# Patient Record
Sex: MALE | Race: WHITE | Employment: FULL TIME | URBAN - METROPOLITAN AREA
[De-identification: names, ages, dates, MRNs, and addresses within clinical notes are randomized per-mention and may not be internally consistent; named-entity substitution may affect disease eponyms.]

---

## 2021-03-04 ENCOUNTER — APPOINTMENT (OUTPATIENT)
Dept: ULTRASOUND IMAGING | Age: 46
End: 2021-03-04
Attending: EMERGENCY MEDICINE
Payer: MEDICARE

## 2021-03-04 ENCOUNTER — HOSPITAL ENCOUNTER (EMERGENCY)
Age: 46
Discharge: HOME | End: 2021-03-04
Attending: EMERGENCY MEDICINE
Payer: MEDICARE

## 2021-03-04 VITALS
RESPIRATION RATE: 17 BRPM | BODY MASS INDEX: 29.62 KG/M2 | OXYGEN SATURATION: 97 % | WEIGHT: 200 LBS | TEMPERATURE: 98.5 F | HEIGHT: 69 IN | HEART RATE: 100 BPM | DIASTOLIC BLOOD PRESSURE: 96 MMHG | SYSTOLIC BLOOD PRESSURE: 139 MMHG

## 2021-03-04 DIAGNOSIS — M79.674 PAIN OF TOE OF RIGHT FOOT: Primary | ICD-10-CM

## 2021-03-04 LAB
BASOPHILS # BLD: 0.1 K/UL (ref 0–0.2)
BASOPHILS NFR BLD: 1 % (ref 0–2)
DIFFERENTIAL METHOD BLD: ABNORMAL
EOSINOPHIL # BLD: 0.2 K/UL (ref 0–0.8)
EOSINOPHIL NFR BLD: 2 % (ref 0.5–7.8)
ERYTHROCYTE [DISTWIDTH] IN BLOOD BY AUTOMATED COUNT: 14.3 % (ref 11.9–14.6)
HCT VFR BLD AUTO: 49.6 % (ref 41.1–50.3)
HGB BLD-MCNC: 17.1 G/DL (ref 13.6–17.2)
IMM GRANULOCYTES # BLD AUTO: 0.1 K/UL (ref 0–0.5)
IMM GRANULOCYTES NFR BLD AUTO: 0 % (ref 0–5)
INR PPP: 0.9
LYMPHOCYTES # BLD: 1.9 K/UL (ref 0.5–4.6)
LYMPHOCYTES NFR BLD: 15 % (ref 13–44)
MCH RBC QN AUTO: 27 PG (ref 26.1–32.9)
MCHC RBC AUTO-ENTMCNC: 34.5 G/DL (ref 31.4–35)
MCV RBC AUTO: 78.4 FL (ref 79.6–97.8)
MONOCYTES # BLD: 0.8 K/UL (ref 0.1–1.3)
MONOCYTES NFR BLD: 6 % (ref 4–12)
NEUTS SEG # BLD: 10 K/UL (ref 1.7–8.2)
NEUTS SEG NFR BLD: 76 % (ref 43–78)
NRBC # BLD: 0 K/UL (ref 0–0.2)
PLATELET # BLD AUTO: 323 K/UL (ref 150–450)
PMV BLD AUTO: 9.5 FL (ref 9.4–12.3)
PROTHROMBIN TIME: 13 SEC (ref 12.5–14.7)
RBC # BLD AUTO: 6.33 M/UL (ref 4.23–5.6)
WBC # BLD AUTO: 13 K/UL (ref 4.3–11.1)

## 2021-03-04 PROCEDURE — 85025 COMPLETE CBC W/AUTO DIFF WBC: CPT

## 2021-03-04 PROCEDURE — 99283 EMERGENCY DEPT VISIT LOW MDM: CPT

## 2021-03-04 PROCEDURE — 85610 PROTHROMBIN TIME: CPT

## 2021-03-04 PROCEDURE — 93926 LOWER EXTREMITY STUDY: CPT

## 2021-03-04 RX ORDER — SULFAMETHOXAZOLE AND TRIMETHOPRIM 800; 160 MG/1; MG/1
1 TABLET ORAL DAILY
COMMUNITY

## 2021-03-04 RX ORDER — CLOPIDOGREL BISULFATE 75 MG/1
75 TABLET ORAL DAILY
COMMUNITY

## 2021-03-04 NOTE — ED TRIAGE NOTES
Pt c/o R foot 4th toe loss of sensation and purple discoloration for the past few days. States he has a hx of DVT that has moved down his leg. He is currently on Plavix and bactrim. States his medical charts are all in [place].

## 2021-03-04 NOTE — ED PROVIDER NOTES
54year old male who presents today with complaint of right 4th toe becoming purple for the past 2 days. He noticed this again around 1400 today and called his doctors in [place] who told him to come to the ER today. He states that the discoloration has improved since he first noticed it. He reports hx of a blood clots and states he is on Plavix. He states that about 3 months ago he had been diagnosed with a clot in his left lower abdomen that traveled into his right leg. This caused some necrosis to the 4th digit in his right foot, which left him with some loss of sensation in that toe. He states \"I was going to lose that toe but they started a bunch of blood thinners. 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 The history is provided by the patient. Past Medical History:   Diagnosis Date    Thrombosis of abdominal aorta (HCC)     Whipple's disease        History reviewed. No pertinent surgical history. History reviewed. No pertinent family history.     Social History     Socioeconomic History    Marital status: SINGLE     Spouse name: Not on file    Number of children: Not on file    Years of education: Not on file    Highest education level: Not on file   Occupational History    Not on file   Social Needs    Financial resource strain: Not on file    Food insecurity     Worry: Not on file     Inability: Not on file    Transportation needs     Medical: Not on file     Non-medical: Not on file   Tobacco Use    Smoking status: Never Smoker    Smokeless tobacco: Current User   Substance and Sexual Activity    Alcohol use: Yes     Comment: occasional    Drug use: Never    Sexual activity: Not Currently   Lifestyle    Physical activity     Days per week: Not on file     Minutes per session: Not on file    Stress: Not on file   Relationships    Social connections     Talks on phone: Not on file     Gets together: Not on file     Attends [employer] service: Not on file     Active member of club or organization: Not on file     Attends meetings of clubs or organizations: Not on file     Relationship status: Not on file    Intimate partner violence     Fear of current or ex partner: Not on file     Emotionally abused: Not on file     Physically abused: Not on file     Forced sexual activity: Not on file   Other Topics Concern    Not on file   Social History Narrative    Not on file         ALLERGIES: Penicillins    Review of Systems   Constitutional: Negative for fatigue and fever. Respiratory: Negative for shortness of breath. Cardiovascular: Negative for chest pain and leg swelling. Gastrointestinal: Negative for diarrhea, nausea and vomiting. Skin: Positive for color change. 4th toe on right foot   Neurological: Negative for dizziness, syncope, light-headedness and headaches. Vitals:    03/04/21 1634   BP: (!) 139/96   Pulse: (!) 105   Resp: 18   Temp: 98.5 °F (36.9 °C)   SpO2: 96%   Weight: 90.7 kg (200 lb)   Height: 5' 9\" (1.753 m)            Physical Exam  Vitals signs and nursing note reviewed. Constitutional:       Appearance: Normal appearance. HENT:      Head: Normocephalic and atraumatic. Right Ear: External ear normal.      Left Ear: External ear normal.      Mouth/Throat:      Mouth: Mucous membranes are moist.      Pharynx: Oropharynx is clear. Eyes:      General: No scleral icterus. Extraocular Movements: Extraocular movements intact. Neck:      Musculoskeletal: Normal range of motion and neck supple. Cardiovascular:      Rate and Rhythm: Normal rate. Pulses: Normal pulses. Dorsalis pedis pulses are 2+ on the right side and 2+ on the left side. Posterior tibial pulses are 2+ on the right side and 2+ on the left side. Heart sounds: Normal heart sounds.    Pulmonary:      Effort: Pulmonary effort is normal. No respiratory distress. Breath sounds: Normal breath sounds. Abdominal:      General: Abdomen is flat. Bowel sounds are normal.      Palpations: Abdomen is soft. Musculoskeletal: Normal range of motion. Right lower leg: No edema. Left lower leg: No edema. Feet:      Right foot:      Skin integrity: Skin integrity normal.      Left foot:      Skin integrity: Skin integrity normal.      Comments: Slight cyanosis discoloration to 4th and 5th digit of right foot, easily blanchable. No edema. He has strong, equal pulses bilaterally. He reports lack of sensation in 4th digit of right foot upon exam, but normal sensation in other digits. He has good capillary refill <3 seconds. Skin:     General: Skin is warm and dry. Neurological:      General: No focal deficit present. Mental Status: He is alert and oriented to person, place, and time. Psychiatric:         Mood and Affect: Mood normal.         Behavior: Behavior normal.          MDM  Number of Diagnoses or Management Options  Pain of toe of right foot  Diagnosis management comments: Ultrasound ordered to evaluate for arterial clot. 9:41 PM  Preliminary report from Dr. Jose De Jesus Grajeda reported as normal flow through popliteal. I have discussed results with patient. His toes at this time appear pink and warm, he has had his feet elevated on stretcher. I discussed with him the importance of continuing his medications and follow-up with his physician in [address] [place] [address]. I have provided him with information for Dr. Samina Malin with [facility] here in the case that he is in town for an extended period. He verbalizes understanding of information provided and to return to the ER for worsening of symptoms. He agrees with plan to discharge home at this time.         Amount and/or Complexity of Data Reviewed  Clinical lab tests: ordered and reviewed  Tests in the radiology section of CPT®: ordered and reviewed    Risk of Complications, Morbidity, and/or Mortality  Presenting problems: high  Diagnostic procedures: moderate  Management options: moderate    Patient Progress  Patient progress: improved    ED Course as of Mar 04 2141   Thu Mar 04, 2021   1735 I have requested records from [facility] in [place], [place]. Patient is unsure if reported clot he had 3 months ago was arterial or venous. [BA]   1831 Records received from [facility]. Note states patient was supposed to be on Eliquis. Patient states he is on Eliquis, but he could not remember the name. He is also on Plavix and Bactrim daily. Denies other medications. Note states he had a thrombus of the abdominal aorta.      [BA]   2025 Prothrombin time: 13.0 [BA]   2025 INR: 0.9 [BA]   2025 PLATELET: 603 [BA]   2140 Preliminary report by Dr. Michel Marcelo normal flow through right popliteal.     [BA]      ED Course User Index  [BA] Shanthi Escamilla NP       Procedures

## 2021-03-04 NOTE — ED NOTES
Upon examination, pt's toe is pink and warm. He states it was purple and cool to touch earlier today.

## 2021-03-05 NOTE — DISCHARGE INSTRUCTIONS
As we discussed, the ultrasound did not show any clot extending to your popliteal (knee) area. Continue taking your medications as prescribed. Follow-up with your doctors in [place]. Return to the ER for any worsening or new, concerning symptoms.

## 2021-03-05 NOTE — ED NOTES
I have reviewed discharge instructions with the patient. The patient verbalized understanding. Patient left ED via Discharge Method: ambulatory to Home with self. Opportunity for questions and clarification provided. Patient given 0 scripts. To continue your aftercare when you leave the hospital, you may receive an automated call from our care team to check in on how you are doing. This is a free service and part of our promise to provide the best care and service to meet your aftercare needs.  If you have questions, or wish to unsubscribe from this service please call [phone]. Thank you for Choosing our [facility] Emergency Department.

## 2021-03-08 ENCOUNTER — HOSPITAL ENCOUNTER (OUTPATIENT)
Dept: CT IMAGING | Age: 46
Discharge: HOME | End: 2021-03-08
Attending: NURSE PRACTITIONER
Payer: MEDICARE

## 2021-03-08 DIAGNOSIS — R68.89 ALTERATION IN BLOOD PRESSURE: ICD-10-CM

## 2021-03-08 DIAGNOSIS — I74.10 AORTIC THROMBUS (HCC): ICD-10-CM

## 2021-03-08 DIAGNOSIS — R23.0 TOE CYANOSIS: ICD-10-CM

## 2021-03-08 PROCEDURE — 71275 CT ANGIOGRAPHY CHEST: CPT

## 2021-03-08 PROCEDURE — 74011000636 HC RX REV CODE- 636: Performed by: NURSE PRACTITIONER

## 2021-03-08 PROCEDURE — 74011000258 HC RX REV CODE- 258: Performed by: NURSE PRACTITIONER

## 2021-03-08 PROCEDURE — 75635 CT ANGIO ABDOMINAL ARTERIES: CPT

## 2021-03-08 RX ORDER — SODIUM CHLORIDE 0.9 % (FLUSH) 0.9 %
10 SYRINGE (ML) INJECTION
Status: COMPLETED | OUTPATIENT
Start: 2021-03-08 | End: 2021-03-08

## 2021-03-08 RX ADMIN — SODIUM CHLORIDE 100 ML: 900 INJECTION, SOLUTION INTRAVENOUS at 14:37

## 2021-03-08 RX ADMIN — IOPAMIDOL 100 ML: 755 INJECTION, SOLUTION INTRAVENOUS at 14:37

## 2021-03-08 RX ADMIN — Medication 10 ML: at 14:37
